# Patient Record
Sex: FEMALE | Race: BLACK OR AFRICAN AMERICAN | NOT HISPANIC OR LATINO
[De-identification: names, ages, dates, MRNs, and addresses within clinical notes are randomized per-mention and may not be internally consistent; named-entity substitution may affect disease eponyms.]

---

## 2017-03-31 ENCOUNTER — APPOINTMENT (OUTPATIENT)
Dept: OBGYN | Facility: CLINIC | Age: 27
End: 2017-03-31

## 2017-05-10 ENCOUNTER — APPOINTMENT (OUTPATIENT)
Dept: OBGYN | Facility: CLINIC | Age: 27
End: 2017-05-10

## 2017-05-10 VITALS — HEIGHT: 62 IN | DIASTOLIC BLOOD PRESSURE: 84 MMHG | SYSTOLIC BLOOD PRESSURE: 135 MMHG | HEART RATE: 76 BPM

## 2017-05-10 DIAGNOSIS — N76.0 ACUTE VAGINITIS: ICD-10-CM

## 2017-05-10 DIAGNOSIS — R10.9 UNSPECIFIED ABDOMINAL PAIN: ICD-10-CM

## 2017-05-10 DIAGNOSIS — M54.5 LOW BACK PAIN: ICD-10-CM

## 2017-05-10 DIAGNOSIS — A56.8 SEXUALLY TRANSMITTED CHLAMYDIAL INFECTION OF OTHER SITES: ICD-10-CM

## 2017-05-10 DIAGNOSIS — R03.0 ELEVATED BLOOD-PRESSURE READING, W/OUT DIAGNOSIS OF HYPERTENSION: ICD-10-CM

## 2017-05-11 LAB
CANDIDA VAG CYTO: DETECTED
G VAGINALIS+PREV SP MTYP VAG QL MICRO: DETECTED
HBA1C MFR BLD HPLC: 5.9 %
T VAGINALIS VAG QL WET PREP: NOT DETECTED

## 2019-05-28 ENCOUNTER — EMERGENCY (EMERGENCY)
Facility: HOSPITAL | Age: 29
LOS: 1 days | Discharge: HOME | End: 2019-05-28
Attending: EMERGENCY MEDICINE
Payer: SELF-PAY

## 2019-05-28 VITALS
HEART RATE: 97 BPM | RESPIRATION RATE: 20 BRPM | WEIGHT: 169.98 LBS | DIASTOLIC BLOOD PRESSURE: 87 MMHG | OXYGEN SATURATION: 100 % | SYSTOLIC BLOOD PRESSURE: 132 MMHG | TEMPERATURE: 98 F

## 2019-05-28 DIAGNOSIS — Z98.891 HISTORY OF UTERINE SCAR FROM PREVIOUS SURGERY: Chronic | ICD-10-CM

## 2019-05-28 PROCEDURE — 99283 EMERGENCY DEPT VISIT LOW MDM: CPT | Mod: 25

## 2019-05-28 PROCEDURE — 99053 MED SERV 10PM-8AM 24 HR FAC: CPT

## 2019-05-29 VITALS
SYSTOLIC BLOOD PRESSURE: 142 MMHG | RESPIRATION RATE: 18 BRPM | OXYGEN SATURATION: 100 % | DIASTOLIC BLOOD PRESSURE: 85 MMHG | HEART RATE: 80 BPM

## 2019-05-29 PROCEDURE — 99283 EMERGENCY DEPT VISIT LOW MDM: CPT

## 2019-05-29 RX ORDER — IBUPROFEN 200 MG
600 TABLET ORAL ONCE
Refills: 0 | Status: COMPLETED | OUTPATIENT
Start: 2019-05-29 | End: 2019-05-29

## 2019-05-29 RX ADMIN — Medication 600 MILLIGRAM(S): at 00:28
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
## 2019-05-29 NOTE — ED PROVIDER NOTE - ATTENDING CONTRIBUTION TO CARE
Valeri Douglas MD - Attending Physician: I have personally seen and examined this patient. I have discussed the case with the ACP. I have reviewed all pertinent clinical information, including history, physical exam, plan and the ACP’s note and agree except as noted. See MDM

## 2019-05-29 NOTE — ED PROVIDER NOTE - NSFOLLOWUPINSTRUCTIONS_ED_ALL_ED_FT
Thank you for visiting our Emergency Department, it has been a pleasure taking part in your healthcare.    Please follow up with your Primary Doctor in 2-3 days.      Contusion  A contusion is a deep bruise. Contusions are the result of a blunt injury to tissues and muscle fibers under the skin. The skin overlying the contusion may turn blue, purple, or yellow. Symptoms also include pain and swelling in the injured area.    SEEK IMMEDIATE MEDICAL CARE IF YOU HAVE ANY OF THE FOLLOWING SYMPTOMS: severe pain, numbness, tingling, pain, weakness, or skin color/temperature change in any part of your body distal to the injury.
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
## 2019-05-29 NOTE — ED PROVIDER NOTE - PMH
Chronic instability of knee, right knee    Hypertension  prescribed anti-hypertensives in Dec 2015. Last use was March 2016 because of dizziness related to medication. Not currently on meds  Implanon in place    Preeclampsia  with both pregnancies in 2013 and 2015
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
## 2019-05-29 NOTE — ED PROVIDER NOTE - PHYSICAL EXAMINATION
GEN: Pt in NAD, A&O x3.  PSYCH: Affect appropriate.  EYES: Sclera white w/o injection, no periocular ecchymosis. PERRLA, EOMI.   ENT: Nose w/o deformity, no DC. No auricular TTP, decreased hearing L side to finger rub, TMs pearly grey, no hemotympanum, no DC. MMM, no dentaltruma or damage, no evidence of trauma to the oral mucosa. Neck supple FROM. Jaw FROM no clicking, no bony joiny ttp, breaks tongue depressor.  RESP: No chest wall tenderness, CTA b/l, no wheezes, rales, or rhonchi.   CARDIAC: RRR, clear distinct S1, S2, no appreciable murmurs.  ABD: Abdomen soft, non-tender. No CVAT b/l.  VASC: No edema or calf tenderness.  MSK: NO bony joint ttp or joint deformity. No ecchymosis palpated over bony landmarks of the face.  NEURO: CN2-12 grossly intact, no focal motor or sensory deficits.  SKIN: No rashes on the trunk. GEN: Pt in NAD, A&O x3.  PSYCH: Affect appropriate.  EYES: Sclera white w/o injection, no periocular ecchymosis. PERRLA, EOMI.   ENT: Nose w/o deformity, no DC. No auricular TTP, decreased hearing L side to finger rub, TMs pearly grey, no hemotympanum, no DC. MMM, no dental trauma or damage, no evidence of trauma to the oral mucosa. Neck supple FROM. Jaw FROM no clicking, no bony joiny ttp, breaks tongue depressor.  RESP: No chest wall tenderness, CTA b/l, no wheezes, rales, or rhonchi.   CARDIAC: RRR, clear distinct S1, S2, no appreciable murmurs.  ABD: Abdomen soft, non-tender. No CVAT b/l.  VASC: No edema or calf tenderness.  MSK: NO bony joint ttp or joint deformity. No ecchymosis palpated over bony landmarks of the face.  NEURO: CN2-12 grossly intact, no focal motor or sensory deficits.  SKIN: No rashes on the trunk.
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
## 2019-07-03 ENCOUNTER — EMERGENCY (EMERGENCY)
Facility: HOSPITAL | Age: 29
LOS: 1 days | Discharge: HOME | End: 2019-07-03
Attending: EMERGENCY MEDICINE
Payer: COMMERCIAL

## 2019-07-03 VITALS
OXYGEN SATURATION: 100 % | HEIGHT: 66 IN | TEMPERATURE: 98 F | DIASTOLIC BLOOD PRESSURE: 95 MMHG | HEART RATE: 74 BPM | SYSTOLIC BLOOD PRESSURE: 146 MMHG | WEIGHT: 164.91 LBS | RESPIRATION RATE: 18 BRPM

## 2019-07-03 DIAGNOSIS — Z98.891 HISTORY OF UTERINE SCAR FROM PREVIOUS SURGERY: Chronic | ICD-10-CM

## 2019-07-03 PROCEDURE — 99283 EMERGENCY DEPT VISIT LOW MDM: CPT

## 2019-07-04 LAB — S PYO AG SPEC QL IA: NEGATIVE — SIGNIFICANT CHANGE UP

## 2019-07-04 PROCEDURE — 99283 EMERGENCY DEPT VISIT LOW MDM: CPT

## 2019-07-04 PROCEDURE — 87880 STREP A ASSAY W/OPTIC: CPT

## 2019-07-04 PROCEDURE — 87081 CULTURE SCREEN ONLY: CPT

## 2019-07-04 RX ORDER — IBUPROFEN 200 MG
600 TABLET ORAL ONCE
Refills: 0 | Status: COMPLETED | OUTPATIENT
Start: 2019-07-04 | End: 2019-07-04

## 2019-07-04 RX ORDER — GLYCERIN 1 %
1 DROPS OPHTHALMIC (EYE) ONCE
Refills: 0 | Status: COMPLETED | OUTPATIENT
Start: 2019-07-04 | End: 2019-07-04

## 2019-07-04 RX ADMIN — Medication 600 MILLIGRAM(S): at 02:36

## 2019-07-04 RX ADMIN — Medication 600 MILLIGRAM(S): at 02:07

## 2019-07-04 RX ADMIN — Medication 1 DROP(S): at 02:07

## 2019-07-04 NOTE — ED PROVIDER NOTE - PROGRESS NOTE DETAILS
Rapid strep neg. Will d/c home with return precautions, instructed to f/u with primary medical doctor this week  Tip Lim MD, PGY3 Emergency Medicine

## 2019-07-04 NOTE — ED ADULT NURSE NOTE - OBJECTIVE STATEMENT
28 y/o female presented to the ED with Children, pt states that daughter has been having a sore throat for the past 3 days. "thought it was strept." pt stated she woke up today with throat pain. hard to swallow. thought she saw a pustule on her throat. denies any fever, chills, chest pain, SOB. pt does c/o of bilateral eye itchiness and pain. on exam pt has redness to sclera and conjunctive area. no discharge noted. pt able to speak in complete sentences. on room air with no signs of distress. A&Ox3. children with patient. awaiting MD dispo

## 2019-07-04 NOTE — ED PROVIDER NOTE - OBJECTIVE STATEMENT
29F, no med hx, presenting with chief complaint of 3 days mild dry cough, BL eye redness, and sore throat. No associated fevers or chills, nausea or vomiting, headache, dizziness, lightheadedness, chest pain, shortness of breath, abdominal pain, diarrhea or constipation, urinary sx. No eye discharge. Able to tolerate PO without issue. Daughter at home with similar symptoms of eye redness and cough.  Denies meds, allergies, tobacco, ethanol, or drug use.

## 2019-07-04 NOTE — ED PROVIDER NOTE - CLINICAL SUMMARY MEDICAL DECISION MAKING FREE TEXT BOX
29F w 3 days dry cough, sore throat, BL eye redness. Exam as above. No associated fevers or chills, nausea or vomiting. Sick daughter at home with similar. Concern for viral illness. Given tonsillar exudate, will check rapid strep   Discharge home anticipated.   Tip Lim MD, PGY3 Emergency Medicine

## 2019-07-04 NOTE — ED ADULT NURSE NOTE - CHPI ED NUR SYMPTOMS NEG
no headache/no abdominal pain/no fever/no decreased eating/drinking/no diarrhea/no rash/no shortness of breath/no vomiting/no cough/no chills

## 2019-07-04 NOTE — ED PROVIDER NOTE - NSFOLLOWUPINSTRUCTIONS_ED_ALL_ED_FT
Please follow up with primary medical doctor this week for further care    Take motrin 600mg every 6 hours as needed for pain  Take Tylenol up to 650 mg every 6 hours as needed for pain.  Eye drops (such as visene) can be purchased over counter as pharmacy     Return to hospital for any new or concerning symptoms, including but not limited to: fevers, chills, nausea, vomiting, headache, dizziness, lightheadedness, chest pain, shortness of breath, difficulty breathing, abdominal pain, weakness, or any other new or concerning symptoms.

## 2019-07-04 NOTE — ED PROVIDER NOTE - ATTENDING CONTRIBUTION TO CARE
attending Akash: 29yF no PMH p/w 3 days mild dry cough, BL eye redness, and sore throat. Tolerating PO. Daughter with same symptoms. Exam, afebrile, well appearing, b/l tonsillar edema and exudates, clear lungs, no cervical LAD. Likely viral pharyngitis. Will obtain rapid strep, symptomatic treatment, reassess

## 2019-07-04 NOTE — ED PROVIDER NOTE - NS ED ROS FT
Please see HPI section of chart for further detailed Review of Systems    dry cough, BL eye redness, sore throat

## 2020-06-18 ENCOUNTER — APPOINTMENT (OUTPATIENT)
Dept: OBGYN | Facility: CLINIC | Age: 30
End: 2020-06-18

## 2020-07-10 ENCOUNTER — APPOINTMENT (OUTPATIENT)
Dept: OBGYN | Facility: CLINIC | Age: 30
End: 2020-07-10
Payer: COMMERCIAL

## 2020-07-10 VITALS
SYSTOLIC BLOOD PRESSURE: 120 MMHG | DIASTOLIC BLOOD PRESSURE: 84 MMHG | BODY MASS INDEX: 33.31 KG/M2 | HEIGHT: 62 IN | WEIGHT: 181 LBS

## 2020-07-10 PROCEDURE — 11980 IMPLANT HORMONE PELLET(S): CPT

## 2020-07-10 PROCEDURE — 11982 REMOVE DRUG IMPLANT DEVICE: CPT

## 2020-07-10 RX ORDER — ETONOGESTREL 68 MG/1
68 IMPLANT SUBCUTANEOUS
Refills: 0 | Status: COMPLETED | OUTPATIENT
Start: 2020-07-10

## 2020-07-10 RX ADMIN — ETONOGESTREL 0 MG: 68 IMPLANT SUBCUTANEOUS at 00:00

## 2020-08-13 NOTE — ED ADULT NURSE NOTE - DISCHARGE DATE/TIME
Detail Level: Simple
Render In Strict Bullet Format?: No
Continue Regimen: Triamcinolone cream 0.1% BID prn
04-Jul-2019 02:35

## 2021-03-18 ENCOUNTER — APPOINTMENT (OUTPATIENT)
Dept: OBGYN | Facility: CLINIC | Age: 31
End: 2021-03-18
Payer: COMMERCIAL

## 2021-03-18 VITALS
DIASTOLIC BLOOD PRESSURE: 72 MMHG | SYSTOLIC BLOOD PRESSURE: 128 MMHG | BODY MASS INDEX: 34.23 KG/M2 | TEMPERATURE: 98 F | WEIGHT: 186 LBS | HEIGHT: 62 IN

## 2021-03-18 DIAGNOSIS — Z30.017 ENCOUNTER FOR INITIAL PRESCRIPTION OF IMPLANTABLE SUBDERMAL CONTRACEPTIVE: ICD-10-CM

## 2021-03-18 DIAGNOSIS — N92.6 IRREGULAR MENSTRUATION, UNSPECIFIED: ICD-10-CM

## 2021-03-18 PROCEDURE — 99213 OFFICE O/P EST LOW 20 MIN: CPT | Mod: 25

## 2021-03-18 PROCEDURE — 11982 REMOVE DRUG IMPLANT DEVICE: CPT

## 2021-03-18 PROCEDURE — 99072 ADDL SUPL MATRL&STAF TM PHE: CPT

## 2021-03-18 RX ORDER — NYSTATIN AND TRIAMCINOLONE ACETONIDE 100000; 1 [USP'U]/G; MG/G
100000-0.1 OINTMENT TOPICAL TWICE DAILY
Qty: 1 | Refills: 0 | Status: ACTIVE | COMMUNITY
Start: 2021-03-18 | End: 1900-01-01

## 2021-03-18 RX ORDER — FLUCONAZOLE 150 MG/1
150 TABLET ORAL
Qty: 1 | Refills: 2 | Status: ACTIVE | COMMUNITY
Start: 2021-03-18 | End: 1900-01-01

## 2021-03-18 NOTE — PLAN
[FreeTextEntry1] : TSH \par recent MRI w ovarian cyst 2 cm and possiblel nodule on thyroid (per pt\par \par Pt desires removal for pending surgery and  wt gain.\par \par Plan on condoms though not sexually active since 2019 \par \par increased discharge and vular itch\par SSE: small white nml pH\par in light of itch will treat for yeast

## 2021-12-09 RX ORDER — NORETHINDRONE ACETATE AND ETHINYL ESTRADIOL, ETHINYL ESTRADIOL AND FERROUS FUMARATE 1MG-10(24)
1 MG-10 MCG / KIT ORAL DAILY
Qty: 1 | Refills: 6 | Status: DISCONTINUED | COMMUNITY
Start: 2021-12-07 | End: 2021-12-09

## 2021-12-09 RX ORDER — NORETHINDRONE ACETATE AND ETHINYL ESTRADIOL AND FERROUS FUMARATE 1MG-20(24)
1-20 KIT ORAL
Qty: 3 | Refills: 3 | Status: ACTIVE | COMMUNITY
Start: 2021-12-09 | End: 1900-01-01

## 2023-01-30 NOTE — ED ADULT NURSE NOTE - NS ED NURSE RECORD ANOTHER HT AND WT
Yes Qbrexza Pregnancy And Lactation Text: There is no available data on Qbrexza use in pregnant women.  There is no available data on Qbrexza use in lactation.

## 2023-03-22 ENCOUNTER — LABORATORY RESULT (OUTPATIENT)
Age: 33
End: 2023-03-22

## 2023-03-22 ENCOUNTER — APPOINTMENT (OUTPATIENT)
Dept: OBGYN | Facility: CLINIC | Age: 33
End: 2023-03-22
Payer: COMMERCIAL

## 2023-03-22 VITALS
DIASTOLIC BLOOD PRESSURE: 88 MMHG | SYSTOLIC BLOOD PRESSURE: 144 MMHG | BODY MASS INDEX: 32.2 KG/M2 | WEIGHT: 175 LBS | HEIGHT: 62 IN

## 2023-03-22 DIAGNOSIS — Z01.419 ENCOUNTER FOR GYNECOLOGICAL EXAMINATION (GENERAL) (ROUTINE) W/OUT ABNORMAL FINDINGS: ICD-10-CM

## 2023-03-22 DIAGNOSIS — Z78.9 OTHER SPECIFIED HEALTH STATUS: ICD-10-CM

## 2023-03-22 DIAGNOSIS — Z30.46 ENCOUNTER FOR SURVEILLANCE OF IMPLANTABLE SUBDERMAL CONTRACEPTIVE: ICD-10-CM

## 2023-03-22 DIAGNOSIS — Z87.42 PERSONAL HISTORY OF OTHER DISEASES OF THE FEMALE GENITAL TRACT: ICD-10-CM

## 2023-03-22 PROCEDURE — 99395 PREV VISIT EST AGE 18-39: CPT

## 2023-03-22 NOTE — PLAN
[FreeTextEntry1] : patient consulted on various birth control option. She wishes to try OCP. No contraindication. Risks/benefits and alternatives discussed. Instruction sheet given. Discuss minimal increased risk of VTE with pill usage\par

## 2023-03-22 NOTE — HISTORY OF PRESENT ILLNESS
[FreeTextEntry1] : Patient with Nexplanon removed and now using birth control inconsistently \par She is interested in other methods

## 2023-03-23 LAB
C TRACH RRNA SPEC QL NAA+PROBE: NOT DETECTED
N GONORRHOEA RRNA SPEC QL NAA+PROBE: NOT DETECTED
SOURCE AMPLIFICATION: NORMAL

## 2023-03-27 LAB
CYTOLOGY CVX/VAG DOC THIN PREP: NORMAL
HPV HIGH+LOW RISK DNA PNL CVX: DETECTED

## 2023-03-29 ENCOUNTER — TRANSCRIPTION ENCOUNTER (OUTPATIENT)
Age: 33
End: 2023-03-29

## 2024-01-18 RX ORDER — NORETHINDRONE ACETATE AND ETHINYL ESTRADIOL 1; 20 MG/1; UG/1
1-20 TABLET ORAL DAILY
Qty: 3 | Refills: 3 | Status: ACTIVE | COMMUNITY
Start: 2023-03-22 | End: 1900-01-01

## 2024-01-23 ENCOUNTER — APPOINTMENT (OUTPATIENT)
Dept: OBGYN | Facility: CLINIC | Age: 34
End: 2024-01-23